# Patient Record
Sex: FEMALE | Race: WHITE | Employment: OTHER | URBAN - METROPOLITAN AREA
[De-identification: names, ages, dates, MRNs, and addresses within clinical notes are randomized per-mention and may not be internally consistent; named-entity substitution may affect disease eponyms.]

---

## 2023-08-31 RX ORDER — CITALOPRAM HYDROBROMIDE 10 MG/1
10 TABLET ORAL DAILY
COMMUNITY

## 2023-08-31 NOTE — PROGRESS NOTES
[facility] Pre-Admission Testing Electronic Communication Worksheet for OR/ENDO Procedures        Patient: Sade Jacobson    DOS: 9/6    Arrival Time: 1500    Surgery Time: 36    Meds to Bed:  [] YES    [x]  NO    Transportation Confirmed: [x] YES    []  NO    History and Physical:  [] YES    []  NO  [x] N/A  If yes, please list doctor or Urgent Care and date of H&P:     Additional Clearance(Cardiac, Pulmonary, etc):  [] YES    [x]  NO    Pre-Admission Testing Visit:  [] YES    [x]  NO If no, do labs/testing need to be done DOS?   [] YES    [x]  NO    Medication Reconciliation Complete:  [x] YES    []  NO        Additional Notes:                Interview Complete: [x] YES    []  NO          Savanna Pinto RN  1:41 PM

## 2023-08-31 NOTE — PROGRESS NOTES
Covid testing to be done: If positive---Pt instructed to notify MD ASAP  If negative--pt was instructed to bring Hard copy of Covid results DOP/DOS    Preoperative Screening for Elective Surgery/Invasive Procedures While COVID-19 present in the community     Have you tested positive or have been told to self-isolate for COVID-19 like symptoms within the past 28 days? NO  Do you currently have any of the following symptoms? Fever >100.0 F or 99.9 F in immunocompromised patients? NO  New onset cough, shortness of breath or difficulty breathing? NO  New onset sore throat, myalgia (muscle aches and pains), headache, loss of taste/smell or diarrhea? NO  Have you had a potential exposure to COVID-19 within the past 14 days by:  Close contact with a confirmed case? NO  Close contact with a healthcare worker,  or essential infrastructure worker (grocery store, [employer], gas station, public utilities or transportation)? YES  Do you reside in a congregate setting such as; skilled nursing facility, adult home, correctional facility, homeless shelter or other institutional setting? NO  Have you had recent travel to a known COVID-19 hotspot? NO     Indicate if the patient has a positive screen by answering yes to one or more of the above questions. If patient is unable to obtain a COVID test prior to DOS/DOP will need RAPID DOS. C-Difficile admission screening and protocol:       * Admitted with diarrhea? [] YES    [x]  NO     *Prior history of C-Diff. In last 3 months? [] YES    [x]  NO     *Antibiotic use in the past 6-8 weeks? [x]  NO    []  YES      If yes, which: REASON_________________     *Prior hospitalization or nursing home in the last month? []  YES    [x]  NO     [facility]. .call before you fall    [address]    Day of Surgery:  9/6                  Arrival time_1500___________          Surgery time_1630___________    Do not eat

## 2023-09-05 ENCOUNTER — ANESTHESIA EVENT (OUTPATIENT)
Dept: ENDOSCOPY | Age: 63
End: 2023-09-05
Payer: MEDICARE

## 2023-09-06 ENCOUNTER — ANESTHESIA (OUTPATIENT)
Dept: ENDOSCOPY | Age: 63
End: 2023-09-06
Payer: MEDICARE

## 2023-09-06 ENCOUNTER — HOSPITAL ENCOUNTER (OUTPATIENT)
Age: 63
Setting detail: OUTPATIENT SURGERY
Discharge: HOME | End: 2023-09-06
Attending: INTERNAL MEDICINE | Admitting: INTERNAL MEDICINE
Payer: MEDICARE

## 2023-09-06 VITALS
HEIGHT: 65 IN | TEMPERATURE: 98 F | SYSTOLIC BLOOD PRESSURE: 157 MMHG | RESPIRATION RATE: 14 BRPM | DIASTOLIC BLOOD PRESSURE: 87 MMHG | WEIGHT: 161.82 LBS | OXYGEN SATURATION: 96 % | HEART RATE: 65 BPM | BODY MASS INDEX: 26.96 KG/M2

## 2023-09-06 DIAGNOSIS — K86.2 CYST OF PANCREAS: ICD-10-CM

## 2023-09-06 DIAGNOSIS — Z86.010 HISTORY OF COLON POLYPS: ICD-10-CM

## 2023-09-06 LAB
AMYLASE FLD-CCNC: 53 U/L
SPECIMEN SOURCE FLD: NORMAL

## 2023-09-06 PROCEDURE — 3609027000 HC COLONOSCOPY: Performed by: INTERNAL MEDICINE

## 2023-09-06 PROCEDURE — 88173 CYTOPATH EVAL FNA REPORT: CPT

## 2023-09-06 PROCEDURE — 6360000002 HC RX W HCPCS: Performed by: INTERNAL MEDICINE

## 2023-09-06 PROCEDURE — 6360000002 HC RX W HCPCS

## 2023-09-06 PROCEDURE — 3700000000 HC ANESTHESIA ATTENDED CARE: Performed by: INTERNAL MEDICINE

## 2023-09-06 PROCEDURE — 7100000001 HC PACU RECOVERY - ADDTL 15 MIN: Performed by: INTERNAL MEDICINE

## 2023-09-06 PROCEDURE — 88307 TISSUE EXAM BY PATHOLOGIST: CPT

## 2023-09-06 PROCEDURE — 2580000003 HC RX 258: Performed by: ANESTHESIOLOGY

## 2023-09-06 PROCEDURE — 3609012400 HC EGD TRANSORAL BIOPSY SINGLE/MULTIPLE: Performed by: INTERNAL MEDICINE

## 2023-09-06 PROCEDURE — 3609020800 HC EGD W/EUS FNA: Performed by: INTERNAL MEDICINE

## 2023-09-06 PROCEDURE — 88342 IMHCHEM/IMCYTCHM 1ST ANTB: CPT

## 2023-09-06 PROCEDURE — 2709999900 HC NON-CHARGEABLE SUPPLY: Performed by: INTERNAL MEDICINE

## 2023-09-06 PROCEDURE — 7100000010 HC PHASE II RECOVERY - FIRST 15 MIN: Performed by: INTERNAL MEDICINE

## 2023-09-06 PROCEDURE — 82378 CARCINOEMBRYONIC ANTIGEN: CPT

## 2023-09-06 PROCEDURE — 3700000001 HC ADD 15 MINUTES (ANESTHESIA): Performed by: INTERNAL MEDICINE

## 2023-09-06 PROCEDURE — 82150 ASSAY OF AMYLASE: CPT

## 2023-09-06 PROCEDURE — 88305 TISSUE EXAM BY PATHOLOGIST: CPT

## 2023-09-06 PROCEDURE — 7100000011 HC PHASE II RECOVERY - ADDTL 15 MIN: Performed by: INTERNAL MEDICINE

## 2023-09-06 PROCEDURE — 7100000000 HC PACU RECOVERY - FIRST 15 MIN: Performed by: INTERNAL MEDICINE

## 2023-09-06 PROCEDURE — 2500000003 HC RX 250 WO HCPCS

## 2023-09-06 PROCEDURE — 2720000010 HC SURG SUPPLY STERILE: Performed by: INTERNAL MEDICINE

## 2023-09-06 RX ORDER — LIDOCAINE HYDROCHLORIDE 20 MG/ML
INJECTION, SOLUTION EPIDURAL; INFILTRATION; INTRACAUDAL; PERINEURAL PRN
Status: DISCONTINUED | OUTPATIENT
Start: 2023-09-06 | End: 2023-09-06 | Stop reason: SDUPTHER

## 2023-09-06 RX ORDER — SODIUM CHLORIDE 9 MG/ML
INJECTION, SOLUTION INTRAVENOUS PRN
Status: DISCONTINUED | OUTPATIENT
Start: 2023-09-06 | End: 2023-09-06 | Stop reason: HOSPADM

## 2023-09-06 RX ORDER — CIPROFLOXACIN 2 MG/ML
400 INJECTION, SOLUTION INTRAVENOUS ONCE
Status: COMPLETED | OUTPATIENT
Start: 2023-09-06 | End: 2023-09-06

## 2023-09-06 RX ORDER — PROPOFOL 10 MG/ML
INJECTION, EMULSION INTRAVENOUS CONTINUOUS PRN
Status: DISCONTINUED | OUTPATIENT
Start: 2023-09-06 | End: 2023-09-06 | Stop reason: SDUPTHER

## 2023-09-06 RX ORDER — SODIUM CHLORIDE 0.9 % (FLUSH) 0.9 %
5-40 SYRINGE (ML) INJECTION EVERY 12 HOURS SCHEDULED
Status: DISCONTINUED | OUTPATIENT
Start: 2023-09-06 | End: 2023-09-06 | Stop reason: HOSPADM

## 2023-09-06 RX ORDER — ONDANSETRON 2 MG/ML
INJECTION INTRAMUSCULAR; INTRAVENOUS PRN
Status: DISCONTINUED | OUTPATIENT
Start: 2023-09-06 | End: 2023-09-06 | Stop reason: SDUPTHER

## 2023-09-06 RX ORDER — FAMOTIDINE 10 MG/ML
INJECTION, SOLUTION INTRAVENOUS PRN
Status: DISCONTINUED | OUTPATIENT
Start: 2023-09-06 | End: 2023-09-06 | Stop reason: SDUPTHER

## 2023-09-06 RX ORDER — CIPROFLOXACIN 500 MG/1
500 TABLET, FILM COATED ORAL 2 TIMES DAILY
Qty: 10 TABLET | Refills: 0 | Status: SHIPPED | OUTPATIENT
Start: 2023-09-06 | End: 2023-09-11

## 2023-09-06 RX ORDER — ONDANSETRON 2 MG/ML
4 INJECTION INTRAMUSCULAR; INTRAVENOUS
Status: DISCONTINUED | OUTPATIENT
Start: 2023-09-06 | End: 2023-09-06 | Stop reason: HOSPADM

## 2023-09-06 RX ORDER — SODIUM CHLORIDE 0.9 % (FLUSH) 0.9 %
5-40 SYRINGE (ML) INJECTION PRN
Status: DISCONTINUED | OUTPATIENT
Start: 2023-09-06 | End: 2023-09-06 | Stop reason: HOSPADM

## 2023-09-06 RX ADMIN — FAMOTIDINE 20 MG: 10 INJECTION, SOLUTION INTRAVENOUS at 16:09

## 2023-09-06 RX ADMIN — LIDOCAINE HYDROCHLORIDE 100 MG: 20 INJECTION, SOLUTION EPIDURAL; INFILTRATION; INTRACAUDAL; PERINEURAL at 16:15

## 2023-09-06 RX ADMIN — SODIUM CHLORIDE: 9 INJECTION, SOLUTION INTRAVENOUS at 16:09

## 2023-09-06 RX ADMIN — ONDANSETRON 4 MG: 2 INJECTION INTRAMUSCULAR; INTRAVENOUS at 16:09

## 2023-09-06 RX ADMIN — CIPROFLOXACIN 400 MG: 2 INJECTION, SOLUTION INTRAVENOUS at 16:20

## 2023-09-06 RX ADMIN — PROPOFOL 150 MCG/KG/MIN: 10 INJECTION, EMULSION INTRAVENOUS at 16:15

## 2023-09-06 ASSESSMENT — PAIN - FUNCTIONAL ASSESSMENT: PAIN_FUNCTIONAL_ASSESSMENT: 0-10

## 2023-09-06 ASSESSMENT — PAIN SCALES - GENERAL
PAINLEVEL_OUTOF10: 0
PAINLEVEL_OUTOF10: 0

## 2023-09-06 NOTE — DISCHARGE INSTRUCTIONS
Impression:    1. Mild gastritis. Biopsies obtained. 2.  Otherwise normal EGD. 3.  6.13 x 4.74 cm multicystic lesion with appearance of a serous cystadenoma. This is a benign lesion. I drained the largest cystic component and performed FNA of the microcystic component. 4.  4mm colon polyp removed  5. Small grade 1 internal hemorrhoids. Recommendations:   1. Clear liquid diet, advance as tolerated. 2.  Repeat colonoscopy in 7 years based on polyp today  3. Ciprofloxacin 400mg IV given during procedure. STart taking ciprofloxacin 500mg twice daily for 5 days tomorrow morning. 4.  Await biopsies and fluid studies. 5.  Check [facility] patient portal in 7 days for biopsy results. If not able to access the patient portal, call our office for instructions. Boaz Veras MD  [facility]    Discharge Instructions for Colonoscopy     Colonoscopy is a visual exam of the lining of the large intestine, also called the bowel or colon, with a colonoscope. A colonoscope is a flexible tube with a light and a viewing device. It allows the doctor to view the inside of the colon through a tiny video camera. Colonoscopy is performed for many reasons: unexplained anemia , pain, diarrhea , bloody stools, cancer screening, among many other reasons. Complications from a colonoscopy are rare. Some possible serious complications include perforated bowel (which might require surgery) and bleeding (which could require blood transfusion ). Minor complications include bloating, gas, and cramping that can last for 1-2 days after the procedure. Because air is put into your colon during the procedure, it is normal to pass large amounts of air from your rectum. You may not have a bowel movement for 1-3 days after the procedure. What You Will Need:  Someone to drive you home after the procedure     Steps to Take:  [address] when you get home.    Because the sedative will make you drowsy, don't

## 2023-09-06 NOTE — H&P
Pre-operative History and Physical    Patient: Dee Grant  : 1960  Acct#:     HISTORY OF PRESENT ILLNESS:    The patient is a 70 y.o. female who presents with 6cm pancreatic cyst.  Previously drained and had low CEA and amylase. Planning EUS with cyst drainage. Also with dyspepsia for EGD and colonoscopy for history of colon polyps. Past Medical History:        Diagnosis Date    Arthritis     Pancreatic cyst 2014    TMJ locking       Past Surgical History:        Procedure Laterality Date     SECTION      X 3    HYSTERECTOMY, TOTAL ABDOMINAL (CERVIX REMOVED)      OTHER SURGICAL HISTORY  2014    EUS with drainage of pancreatic cyst, gastric biopsy, and buccal brushings      Medications Prior to Admission:   No current facility-administered medications on file prior to encounter. Current Outpatient Medications on File Prior to Encounter   Medication Sig Dispense Refill    metFORMIN (GLUCOPHAGE) 1000 MG tablet Take 1 tablet by mouth 2 times daily (with meals)      valACYclovir HCl (VALTREX PO) Take by mouth as needed      citalopram (CELEXA) 10 MG tablet Take 1 tablet by mouth daily      acetaminophen (TYLENOL) 500 MG tablet Take 500 mg by mouth every 6 hours as needed for Pain. ibuprofen (ADVIL;MOTRIN) 200 MG tablet Take 200 mg by mouth every 6 hours as needed for Pain.      famotidine (PEPCID) 20 MG tablet Take 1 tablet by mouth daily          Allergies:  Patient has no known allergies.     Social History:   Social History     Socioeconomic History    Marital status:      Spouse name: Not on file    Number of children: Not on file    Years of education: Not on file    Highest education level: Not on file   Occupational History    Not on file   Tobacco Use    Smoking status: Never    Smokeless tobacco: Not on file   Vaping Use    Vaping Use: Never used   Substance and Sexual Activity    Alcohol use: Yes     Comment: Rare    Drug use: Never    Sexual activity:

## 2023-09-06 NOTE — OP NOTE
Endoscopy Note    Patient: Tegan Whittaker  : 1960  Acct#:     Procedure: Esophagogastroduodenoscopy with biopsy   Colonoscopy with snare polypectomy   Colonoscopy with intubation of the terminal ileum   EUS with FNA   EUS with cyst drainage   Doppler of mesenteric vessels    Date:  2023     Surgeon:  Mela Merlin, MD    Referring Physician:  Dr. Kerline Figueroa    Indications: This is a 70y.o. year old female who presents today with 6cm pancreatic cyst.  Previously drained and had low CEA and amylase. Planning EUS with cyst drainage. Also with dyspepsia for EGD and colonoscopy for history of colon polyps. Had a polyp in  and none in 2016. Previous Colonoscopy: YES  Date:    Greater than 3 years: YES    Anesthesia:  TIVA    Description of Procedure:  Informed consent was obtained from the patient after explanation of indications, benefits and possible risks and complications of the procedure. The patient was then taken to the endoscopy suite, placed in the left lateral decubitus position and the above IV sedation was administrered. The Olympus videoendoscope was placed in the patient's mouth and under direct visualization passed into the esophagus. The scope was then advanced to the 2nd portion of the duodenum without difficulty. Views were good, patient toleration was good. Retroflexion was performed in the stomach. Findings:  1. The esophagus appeared normal without evidence of Mcnamara's esophagus or reflux esophagitis. 2.  There was mild gastritis. Biopsies were obtained from the antrum and body of the stomach to evaluate for H. Pylori. 3.  Normal duodenum. The villous pattern appeared normal, but given symptoms, multiple small bowel biopsies were obtained to evaluate for celiac disease. Next, the curvilinear array echoendoscope was advanced without difficulty to the 2nd portion of the duodenum. Endosonographic views were good, patient toleration was good. from the patient. Findings:  Ileum:  normal  Colon:  A 4mm polyp was identified in the distal transverse colon and removed completely with cold snare polypectomy down to a clean base confirmed by post-polypectomy photograph. All polyp tissue sent off for pathologic evaluation. Retroflexed view of the rectum: Small grade 1 internal hemorrhoids. The patient tolerated the procedure well and was taken to Recovery in good condition. No complications. EBL: minimal  Specimens taken: yes    Impression:    1. Mild gastritis. Biopsies obtained. 2.  Otherwise normal EGD. 3.  6.13 x 4.74 cm multicystic lesion with appearance of a serous cystadenoma. This is a benign lesion. I drained the largest cystic component and performed FNA of the microcystic component. 4.  4mm colon polyp removed  5. Small grade 1 internal hemorrhoids. Recommendations:   1. Clear liquid diet, advance as tolerated. 2.  Repeat colonoscopy in 7 years based on polyp today. I put this in our system. 3.  Ciprofloxacin 400mg IV given during procedure. STart taking ciprofloxacin 500mg twice daily for 5 days tomorrow morning. 4.  Await biopsies and fluid studies. 5.  Check [facility] patient portal in 7 days for biopsy results. If not able to access the patient portal, call our office for instructions.     Mikie SanchezoMD [facility] Shown

## 2023-09-06 NOTE — ANESTHESIA PRE PROCEDURE
08/12/2016 09:09 AM     BMP    Lab Results   Component Value Date/Time     08/12/2016 09:09 AM    K 4.7 08/12/2016 09:09 AM     08/12/2016 09:09 AM    CO2 26 08/12/2016 09:09 AM    BUN 17 08/12/2016 09:09 AM    CREATININE 0.8 08/12/2016 09:09 AM    CALCIUM 9.6 08/12/2016 09:09 AM    GFRAA >60 08/12/2016 09:09 AM    LABGLOM >60 08/12/2016 09:09 AM    GLUCOSE 157 08/12/2016 09:09 AM     POCGlucose  No results for input(s): GLUCOSE in the last 72 hours. Coags  No results found for: PROTIME, INR, APTT  HCG (If Applicable) No results found for: PREGTESTUR, PREGSERUM, HCG, HCGQUANT   ABGs No results found for: PHART, PO2ART, AOF5GUD, RSK3VZK, BEART, T5NEJOPA   Type & Screen (If Applicable)  No results found for: LABABO, LABRH                         BMI: Wt Readings from Last 3 Encounters:       NPO Status:   Date of last liquid consumption: 09/05/23   Time of last liquid consumption: 2230   Date of last solid food consumption: 09/04/23      Time of last solid consumption: 2200       Anesthesia Evaluation  Patient summary reviewed no history of anesthetic complications:   Airway: Mallampati: III  TM distance: >3 FB   Neck ROM: full  Mouth opening: > = 3 FB   Dental: normal exam         Pulmonary:Negative Pulmonary ROS and normal exam                               Cardiovascular:Negative CV ROS  Exercise tolerance: good (>4 METS),           Rhythm: regular  Rate: normal           Beta Blocker:  Not on Beta Blocker         Neuro/Psych:   Negative Neuro/Psych ROS              GI/Hepatic/Renal:   (+) GERD:,      (-) liver disease and no renal disease      ROS comment: Pancreatic pseudocyst.   Endo/Other:    (+) DiabetesType II DM, , .    (-) blood dyscrasia               Abdominal: normal exam            Vascular: negative vascular ROS. Other Findings:           Anesthesia Plan      MAC     ASA 3       Induction: intravenous. Anesthetic plan and risks discussed with patient and spouse.       Plan

## 2023-09-08 LAB
CEA FLD-MCNC: <0.6 NG/ML
SOURCE BODY FLUID: NORMAL

## 2023-09-13 ENCOUNTER — TELEPHONE (OUTPATIENT)
Dept: SURGERY | Age: 63
End: 2023-09-13

## 2023-09-13 PROBLEM — F43.22 ADJUSTMENT DISORDER WITH ANXIOUS MOOD: Status: ACTIVE | Noted: 2018-11-29

## 2023-09-13 PROBLEM — E78.5 HYPERLIPIDEMIA: Status: ACTIVE | Noted: 2023-09-13

## 2023-09-13 PROBLEM — N18.9 CKD (CHRONIC KIDNEY DISEASE): Status: ACTIVE | Noted: 2023-09-13

## 2023-09-13 PROBLEM — F39 MOOD DISORDER (HCC): Status: ACTIVE | Noted: 2022-08-30

## 2023-09-13 PROBLEM — M26.659 ARTHROPATHY OF TEMPOROMANDIBULAR JOINT: Status: ACTIVE | Noted: 2023-09-13

## 2023-09-13 PROBLEM — I10 HYPERTENSION: Status: ACTIVE | Noted: 2023-09-13

## 2023-09-13 PROBLEM — F43.81 COMPLEX GRIEF DISORDER LASTING LONGER THAN 12 MONTHS: Status: ACTIVE | Noted: 2023-09-13

## 2023-09-13 RX ORDER — METOCLOPRAMIDE 10 MG/1
TABLET ORAL
COMMUNITY
Start: 2023-08-25

## 2023-09-13 RX ORDER — FLUTICASONE PROPIONATE 50 MCG
2 SPRAY, SUSPENSION (ML) NASAL DAILY
COMMUNITY
Start: 2020-01-29

## 2023-09-13 RX ORDER — POLYETHYLENE GLYCOL 3350, SODIUM SULFATE, SODIUM CHLORIDE, POTASSIUM CHLORIDE, ASCORBIC ACID, SODIUM ASCORBATE 140-9-5.2G
KIT ORAL
COMMUNITY
Start: 2023-08-01

## 2023-09-13 RX ORDER — SODIUM, POTASSIUM,MAG SULFATES 17.5-3.13G
SOLUTION, RECONSTITUTED, ORAL ORAL
COMMUNITY
Start: 2023-08-28

## 2023-09-13 RX ORDER — CYCLOBENZAPRINE HCL 10 MG
10 TABLET ORAL NIGHTLY PRN
COMMUNITY
Start: 2019-11-26

## 2023-09-13 RX ORDER — IBUPROFEN 200 MG
200 TABLET ORAL
COMMUNITY

## 2023-09-13 NOTE — TELEPHONE ENCOUNTER
Referral From Dr. Zenon Newman  MRI with MRCP Done at [address] are being pushed  Patient has cystic mass in the distal pancreas 6cm  Biopsy scanned in  Colonoscopy done by Dr. Moy Ayoub in chart with biopsy's results  Called Dr. Indira Henson office for Progress NOTES!  MA requested images from 2023, 2014 and a U/S 2014 Spoke with [facility] at [facility] and she said that she would push them all.

## 2023-09-15 ENCOUNTER — OFFICE VISIT (OUTPATIENT)
Dept: SURGERY | Age: 63
End: 2023-09-15
Payer: MEDICARE

## 2023-09-15 VITALS
SYSTOLIC BLOOD PRESSURE: 152 MMHG | BODY MASS INDEX: 27.99 KG/M2 | HEART RATE: 103 BPM | DIASTOLIC BLOOD PRESSURE: 91 MMHG | TEMPERATURE: 97.9 F | WEIGHT: 168 LBS | HEIGHT: 65 IN

## 2023-09-15 DIAGNOSIS — K86.2 PANCREATIC CYST: Primary | ICD-10-CM

## 2023-09-15 PROCEDURE — 99205 OFFICE O/P NEW HI 60 MIN: CPT | Performed by: SURGERY

## 2023-09-15 PROCEDURE — 1036F TOBACCO NON-USER: CPT | Performed by: SURGERY

## 2023-09-15 PROCEDURE — 3074F SYST BP LT 130 MM HG: CPT | Performed by: SURGERY

## 2023-09-15 PROCEDURE — G8419 CALC BMI OUT NRM PARAM NOF/U: HCPCS | Performed by: SURGERY

## 2023-09-15 PROCEDURE — G8400 PT W/DXA NO RESULTS DOC: HCPCS | Performed by: SURGERY

## 2023-09-15 PROCEDURE — 1123F ACP DISCUSS/DSCN MKR DOCD: CPT | Performed by: SURGERY

## 2023-09-15 PROCEDURE — 3078F DIAST BP <80 MM HG: CPT | Performed by: SURGERY

## 2023-09-15 PROCEDURE — 3017F COLORECTAL CA SCREEN DOC REV: CPT | Performed by: SURGERY

## 2023-09-15 PROCEDURE — 1090F PRES/ABSN URINE INCON ASSESS: CPT | Performed by: SURGERY

## 2023-09-15 PROCEDURE — G8427 DOCREV CUR MEDS BY ELIG CLIN: HCPCS | Performed by: SURGERY

## 2023-12-15 ENCOUNTER — HOSPITAL ENCOUNTER (OUTPATIENT)
Dept: NUCLEAR MEDICINE | Age: 63
Discharge: HOME | End: 2023-12-15
Attending: INTERNAL MEDICINE
Payer: MEDICARE

## 2023-12-15 DIAGNOSIS — R11.0 NAUSEA: ICD-10-CM

## 2023-12-15 DIAGNOSIS — R14.2 ERUCTATION: ICD-10-CM

## 2023-12-15 PROCEDURE — 3430000000 HC RX DIAGNOSTIC RADIOPHARMACEUTICAL: Performed by: INTERNAL MEDICINE

## 2023-12-15 PROCEDURE — A9541 TC99M SULFUR COLLOID: HCPCS | Performed by: INTERNAL MEDICINE

## 2023-12-15 PROCEDURE — 78264 GASTRIC EMPTYING IMG STUDY: CPT

## 2023-12-15 RX ADMIN — Medication 1 MILLICURIE: at 09:09

## 2024-03-05 ENCOUNTER — TELEPHONE (OUTPATIENT)
Dept: SURGERY | Age: 64
End: 2024-03-05

## 2024-03-05 DIAGNOSIS — K86.2 PANCREATIC CYST: Primary | ICD-10-CM

## 2024-03-05 NOTE — TELEPHONE ENCOUNTER
Message left for patient informing her that a CT has been ordered to be performed prior to appointment on 3/12/24.

## 2024-03-07 RX ORDER — NIRMATRELVIR AND RITONAVIR 300-100 MG
3 KIT ORAL 2 TIMES DAILY
COMMUNITY
Start: 2024-01-08

## 2024-03-07 RX ORDER — AZITHROMYCIN 250 MG/1
TABLET, FILM COATED ORAL
COMMUNITY
Start: 2024-01-09

## 2024-04-15 ENCOUNTER — TELEPHONE (OUTPATIENT)
Dept: SURGERY | Age: 64
End: 2024-04-15

## 2024-04-15 PROBLEM — F32.9 MAJOR DEPRESSIVE DISORDER, SINGLE EPISODE, UNSPECIFIED: Status: ACTIVE | Noted: 2024-04-15

## 2024-04-15 NOTE — PROGRESS NOTES
[facility] SURGICAL ONCOLOGY  [address] [place] [address]  [address]  [facility] [address]  Dept: [phone]  Dept Fax: [phone]    Visit Date: 2024    HPI:   Thi Bartholomew is a 64 y.o. female returns today to follow up on her pancreatic cyst and to review recent imaging studies.     Cyst was was first noted on imaging in . At that time, she was being worked up for complaints of severe abdominal pain. Ultrasound revealed normal gallbladder, hepatic steatosis, and a cystic focus in the tail of the pancreas. Cyst was drained via FNA, CEA and amylase were normal.     Just prior to appt in 2023 Thi had EUS with FNA- cyst was drained, CEA was normal and amylase was low. She endorsed experiencing nausea, vomiting, and epigastric discomfort, intermittent diarrhea, no constipation. Since her last visit she has stopped taking Metformin and reports she is feeling significantly better. No nausea or vomiting, no abdominal pain, no diarrhea.     Today she is feeling well. No abdominal pain, nausea, early satiety, or weight loss.     Past Medical History:   Diagnosis Date    Arthritis     Pancreatic cyst 2014    TMJ locking      Past Surgical History:   Procedure Laterality Date     SECTION      X 3    COLONOSCOPY N/A 2023    COLONOSCOPY performed by oJce Lau MD at [facility] ENDOSCOPY    HYSTERECTOMY, TOTAL ABDOMINAL (CERVIX REMOVED)      OTHER SURGICAL HISTORY  2014    EUS with drainage of pancreatic cyst, gastric biopsy, and buccal brushings    UPPER GASTROINTESTINAL ENDOSCOPY N/A 2023    EGD W/EUS FNA performed by Joce Lau MD at [facility] ENDOSCOPY    UPPER GASTROINTESTINAL ENDOSCOPY N/A 2023    EGD BIOPSY performed by Joce Lau MD at [facility] ENDOSCOPY     Family History   Problem Relation Age of Onset    Heart Disease Father        Social History:   Social History     Tobacco Use    Smoking status: Never    Smokeless tobacco: Not on file   Substance Use

## 2024-04-16 ENCOUNTER — TELEPHONE (OUTPATIENT)
Dept: SURGERY | Age: 64
End: 2024-04-16

## 2024-04-17 ENCOUNTER — HOSPITAL ENCOUNTER (OUTPATIENT)
Dept: CT IMAGING | Age: 64
Discharge: HOME | End: 2024-04-17
Attending: SURGERY
Payer: MEDICARE

## 2024-04-17 ENCOUNTER — TELEPHONE (OUTPATIENT)
Dept: SURGERY | Age: 64
End: 2024-04-17

## 2024-04-17 DIAGNOSIS — K86.2 PANCREATIC CYST: ICD-10-CM

## 2024-04-17 LAB
PERFORMED ON: NORMAL
POC CREATININE: 0.6 MG/DL (ref 0.6–1.2)
POC SAMPLE TYPE: NORMAL

## 2024-04-17 PROCEDURE — 71260 CT THORAX DX C+: CPT

## 2024-04-17 PROCEDURE — 82565 ASSAY OF CREATININE: CPT

## 2024-04-17 PROCEDURE — 6360000004 HC RX CONTRAST MEDICATION: Performed by: SURGERY

## 2024-04-17 RX ADMIN — IOPAMIDOL 75 ML: 755 INJECTION, SOLUTION INTRAVENOUS at 11:59

## 2024-04-17 RX ADMIN — IOPAMIDOL 50 ML: 612 INJECTION, SOLUTION INTRAVENOUS at 11:59

## 2024-04-18 PROBLEM — N18.9 CHRONIC KIDNEY DISEASE: Status: RESOLVED | Noted: 2023-09-13 | Resolved: 2024-04-18

## 2024-04-18 PROBLEM — F32.9 MAJOR DEPRESSIVE DISORDER, SINGLE EPISODE, UNSPECIFIED: Status: RESOLVED | Noted: 2024-04-15 | Resolved: 2024-04-18

## 2024-04-18 PROBLEM — F39 MOOD DISORDER (HCC): Status: RESOLVED | Noted: 2022-08-30 | Resolved: 2024-04-18

## 2024-04-19 ENCOUNTER — OFFICE VISIT (OUTPATIENT)
Dept: SURGERY | Age: 64
End: 2024-04-19
Payer: MEDICARE

## 2024-04-19 VITALS
OXYGEN SATURATION: 95 % | RESPIRATION RATE: 16 BRPM | WEIGHT: 166.4 LBS | DIASTOLIC BLOOD PRESSURE: 84 MMHG | HEART RATE: 85 BPM | BODY MASS INDEX: 27.72 KG/M2 | SYSTOLIC BLOOD PRESSURE: 150 MMHG | HEIGHT: 65 IN | TEMPERATURE: 97.5 F

## 2024-04-19 DIAGNOSIS — K86.2 PANCREATIC CYST: Primary | ICD-10-CM

## 2024-04-19 PROCEDURE — G8400 PT W/DXA NO RESULTS DOC: HCPCS | Performed by: SURGERY

## 2024-04-19 PROCEDURE — G8427 DOCREV CUR MEDS BY ELIG CLIN: HCPCS | Performed by: SURGERY

## 2024-04-19 PROCEDURE — 99214 OFFICE O/P EST MOD 30 MIN: CPT | Performed by: SURGERY

## 2024-04-19 PROCEDURE — G8419 CALC BMI OUT NRM PARAM NOF/U: HCPCS | Performed by: SURGERY

## 2024-04-19 PROCEDURE — 1090F PRES/ABSN URINE INCON ASSESS: CPT | Performed by: SURGERY

## 2024-04-19 PROCEDURE — 3079F DIAST BP 80-89 MM HG: CPT | Performed by: SURGERY

## 2024-04-19 PROCEDURE — 1036F TOBACCO NON-USER: CPT | Performed by: SURGERY

## 2024-04-19 PROCEDURE — 3077F SYST BP >= 140 MM HG: CPT | Performed by: SURGERY

## 2024-04-19 PROCEDURE — 3017F COLORECTAL CA SCREEN DOC REV: CPT | Performed by: SURGERY

## 2024-04-19 PROCEDURE — 1123F ACP DISCUSS/DSCN MKR DOCD: CPT | Performed by: SURGERY

## (undated) DEVICE — ENDOSCOPY KIT: Brand: MEDLINE INDUSTRIES, INC.

## (undated) DEVICE — BITE BLOCK ENDOSCP AD 60 FR W/ ADJ STRP PLAS GRN BLOX

## (undated) DEVICE — ENDOSCOPIC ULTRASOUND FINE NEEDLE BIOPSY (FNB) DEVICE: Brand: ACQUIRE

## (undated) DEVICE — FORMALIN CLEAR VIAL 20 ML 10%

## (undated) DEVICE — FORCEPS BX 240CM 2.4MM L NDL RAD JAW 4 M00513334